# Patient Record
Sex: FEMALE | Race: WHITE | NOT HISPANIC OR LATINO | Employment: OTHER | URBAN - METROPOLITAN AREA
[De-identification: names, ages, dates, MRNs, and addresses within clinical notes are randomized per-mention and may not be internally consistent; named-entity substitution may affect disease eponyms.]

---

## 2024-04-09 ENCOUNTER — HOSPITAL ENCOUNTER (OUTPATIENT)
Facility: MEDICAL CENTER | Age: 54
End: 2024-04-12
Attending: EMERGENCY MEDICINE | Admitting: HOSPITALIST
Payer: MEDICARE

## 2024-04-09 DIAGNOSIS — G89.18 POST-OP PAIN: Primary | ICD-10-CM

## 2024-04-09 DIAGNOSIS — I10 HYPERTENSION, UNSPECIFIED TYPE: ICD-10-CM

## 2024-04-09 DIAGNOSIS — G89.18 POST-OPERATIVE PAIN: ICD-10-CM

## 2024-04-09 DIAGNOSIS — R00.0 SINUS TACHYCARDIA: ICD-10-CM

## 2024-04-09 PROCEDURE — 99285 EMERGENCY DEPT VISIT HI MDM: CPT

## 2024-04-09 PROCEDURE — 700111 HCHG RX REV CODE 636 W/ 250 OVERRIDE (IP): Mod: JZ | Performed by: STUDENT IN AN ORGANIZED HEALTH CARE EDUCATION/TRAINING PROGRAM

## 2024-04-09 PROCEDURE — 96375 TX/PRO/DX INJ NEW DRUG ADDON: CPT | Mod: XU

## 2024-04-09 PROCEDURE — 700105 HCHG RX REV CODE 258: Performed by: EMERGENCY MEDICINE

## 2024-04-09 PROCEDURE — 96374 THER/PROPH/DIAG INJ IV PUSH: CPT

## 2024-04-09 PROCEDURE — 700111 HCHG RX REV CODE 636 W/ 250 OVERRIDE (IP): Performed by: EMERGENCY MEDICINE

## 2024-04-09 RX ORDER — SODIUM CHLORIDE 9 MG/ML
1000 INJECTION, SOLUTION INTRAVENOUS ONCE
Status: COMPLETED | OUTPATIENT
Start: 2024-04-09 | End: 2024-04-10

## 2024-04-09 RX ORDER — LORAZEPAM 2 MG/ML
1 INJECTION INTRAMUSCULAR ONCE
Status: COMPLETED | OUTPATIENT
Start: 2024-04-09 | End: 2024-04-09

## 2024-04-09 RX ORDER — SODIUM CHLORIDE 9 MG/ML
INJECTION, SOLUTION INTRAVENOUS CONTINUOUS
Status: DISCONTINUED | OUTPATIENT
Start: 2024-04-09 | End: 2024-04-10

## 2024-04-09 RX ADMIN — SODIUM CHLORIDE 1000 ML: 9 INJECTION, SOLUTION INTRAVENOUS at 22:31

## 2024-04-09 RX ADMIN — FENTANYL CITRATE 100 MCG: 50 INJECTION, SOLUTION INTRAMUSCULAR; INTRAVENOUS at 21:49

## 2024-04-09 RX ADMIN — LORAZEPAM 1 MG: 2 INJECTION INTRAMUSCULAR; INTRAVENOUS at 22:30

## 2024-04-09 ASSESSMENT — PAIN DESCRIPTION - PAIN TYPE: TYPE: ACUTE PAIN

## 2024-04-10 ENCOUNTER — APPOINTMENT (OUTPATIENT)
Dept: CARDIOLOGY | Facility: MEDICAL CENTER | Age: 54
End: 2024-04-10
Attending: HOSPITALIST
Payer: MEDICARE

## 2024-04-10 PROBLEM — R00.0 TACHYCARDIA: Status: ACTIVE | Noted: 2024-04-10

## 2024-04-10 PROBLEM — G89.18 POST-OPERATIVE PAIN: Status: ACTIVE | Noted: 2024-04-10

## 2024-04-10 LAB
ALBUMIN SERPL BCP-MCNC: 3.6 G/DL (ref 3.2–4.9)
ALBUMIN/GLOB SERPL: 1.7 G/DL
ALP SERPL-CCNC: 79 U/L (ref 30–99)
ALT SERPL-CCNC: 11 U/L (ref 2–50)
ANION GAP SERPL CALC-SCNC: 15 MMOL/L (ref 7–16)
AST SERPL-CCNC: 16 U/L (ref 12–45)
BASOPHILS # BLD AUTO: 0.1 % (ref 0–1.8)
BASOPHILS # BLD: 0.02 K/UL (ref 0–0.12)
BILIRUB SERPL-MCNC: 0.4 MG/DL (ref 0.1–1.5)
BLOOD CULTURE HOLD CXBCH: NORMAL
BUN SERPL-MCNC: 19 MG/DL (ref 8–22)
CALCIUM ALBUM COR SERPL-MCNC: 8.7 MG/DL (ref 8.5–10.5)
CALCIUM SERPL-MCNC: 8.4 MG/DL (ref 8.4–10.2)
CHLORIDE SERPL-SCNC: 102 MMOL/L (ref 96–112)
CO2 SERPL-SCNC: 16 MMOL/L (ref 20–33)
CREAT SERPL-MCNC: 0.9 MG/DL (ref 0.5–1.4)
EKG IMPRESSION: NORMAL
EOSINOPHIL # BLD AUTO: 0 K/UL (ref 0–0.51)
EOSINOPHIL NFR BLD: 0 % (ref 0–6.9)
ERYTHROCYTE [DISTWIDTH] IN BLOOD BY AUTOMATED COUNT: 45.5 FL (ref 35.9–50)
GFR SERPLBLD CREATININE-BSD FMLA CKD-EPI: 75 ML/MIN/1.73 M 2
GLOBULIN SER CALC-MCNC: 2.1 G/DL (ref 1.9–3.5)
GLUCOSE SERPL-MCNC: 133 MG/DL (ref 65–99)
HCT VFR BLD AUTO: 30.6 % (ref 37–47)
HGB BLD-MCNC: 9.9 G/DL (ref 12–16)
IMM GRANULOCYTES # BLD AUTO: 0.04 K/UL (ref 0–0.11)
IMM GRANULOCYTES NFR BLD AUTO: 0.3 % (ref 0–0.9)
LV EJECT FRACT  99904: 70
LV EJECT FRACT MOD 2C 99903: 53.26
LV EJECT FRACT MOD 4C 99902: 65.72
LV EJECT FRACT MOD BP 99901: 60.68
LYMPHOCYTES # BLD AUTO: 2.55 K/UL (ref 1–4.8)
LYMPHOCYTES NFR BLD: 18.1 % (ref 22–41)
MCH RBC QN AUTO: 32.8 PG (ref 27–33)
MCHC RBC AUTO-ENTMCNC: 32.4 G/DL (ref 32.2–35.5)
MCV RBC AUTO: 101.3 FL (ref 81.4–97.8)
MONOCYTES # BLD AUTO: 1.11 K/UL (ref 0–0.85)
MONOCYTES NFR BLD AUTO: 7.9 % (ref 0–13.4)
NEUTROPHILS # BLD AUTO: 10.37 K/UL (ref 1.82–7.42)
NEUTROPHILS NFR BLD: 73.6 % (ref 44–72)
NRBC # BLD AUTO: 0 K/UL
NRBC BLD-RTO: 0 /100 WBC (ref 0–0.2)
PLATELET # BLD AUTO: 259 K/UL (ref 164–446)
PMV BLD AUTO: 9.9 FL (ref 9–12.9)
POTASSIUM SERPL-SCNC: 4.9 MMOL/L (ref 3.6–5.5)
PROT SERPL-MCNC: 5.7 G/DL (ref 6–8.2)
RBC # BLD AUTO: 3.02 M/UL (ref 4.2–5.4)
SODIUM SERPL-SCNC: 133 MMOL/L (ref 135–145)
WBC # BLD AUTO: 14.1 K/UL (ref 4.8–10.8)

## 2024-04-10 PROCEDURE — 302874 HCHG BANDAGE ACE 2 OR 3""

## 2024-04-10 PROCEDURE — 96376 TX/PRO/DX INJ SAME DRUG ADON: CPT | Mod: XU

## 2024-04-10 PROCEDURE — 700105 HCHG RX REV CODE 258: Performed by: EMERGENCY MEDICINE

## 2024-04-10 PROCEDURE — 36415 COLL VENOUS BLD VENIPUNCTURE: CPT

## 2024-04-10 PROCEDURE — 700102 HCHG RX REV CODE 250 W/ 637 OVERRIDE(OP): Performed by: HOSPITALIST

## 2024-04-10 PROCEDURE — 94760 N-INVAS EAR/PLS OXIMETRY 1: CPT

## 2024-04-10 PROCEDURE — G0378 HOSPITAL OBSERVATION PER HR: HCPCS

## 2024-04-10 PROCEDURE — 93005 ELECTROCARDIOGRAM TRACING: CPT | Performed by: HOSPITALIST

## 2024-04-10 PROCEDURE — 96375 TX/PRO/DX INJ NEW DRUG ADDON: CPT | Mod: XU

## 2024-04-10 PROCEDURE — 85025 COMPLETE CBC W/AUTO DIFF WBC: CPT

## 2024-04-10 PROCEDURE — A9270 NON-COVERED ITEM OR SERVICE: HCPCS | Performed by: HOSPITALIST

## 2024-04-10 PROCEDURE — 93306 TTE W/DOPPLER COMPLETE: CPT | Mod: 26 | Performed by: INTERNAL MEDICINE

## 2024-04-10 PROCEDURE — 700111 HCHG RX REV CODE 636 W/ 250 OVERRIDE (IP): Mod: JZ | Performed by: EMERGENCY MEDICINE

## 2024-04-10 PROCEDURE — 700102 HCHG RX REV CODE 250 W/ 637 OVERRIDE(OP): Performed by: EMERGENCY MEDICINE

## 2024-04-10 PROCEDURE — 99222 1ST HOSP IP/OBS MODERATE 55: CPT | Mod: 25 | Performed by: HOSPITALIST

## 2024-04-10 PROCEDURE — A9270 NON-COVERED ITEM OR SERVICE: HCPCS | Performed by: EMERGENCY MEDICINE

## 2024-04-10 PROCEDURE — 93306 TTE W/DOPPLER COMPLETE: CPT

## 2024-04-10 PROCEDURE — 80053 COMPREHEN METABOLIC PANEL: CPT

## 2024-04-10 PROCEDURE — 700111 HCHG RX REV CODE 636 W/ 250 OVERRIDE (IP): Mod: JZ | Performed by: STUDENT IN AN ORGANIZED HEALTH CARE EDUCATION/TRAINING PROGRAM

## 2024-04-10 RX ORDER — OXYCODONE HYDROCHLORIDE AND ACETAMINOPHEN 5; 325 MG/1; MG/1
1 TABLET ORAL ONCE
Status: COMPLETED | OUTPATIENT
Start: 2024-04-10 | End: 2024-04-10

## 2024-04-10 RX ORDER — OXYCODONE HYDROCHLORIDE 5 MG/1
5 TABLET ORAL
Status: DISCONTINUED | OUTPATIENT
Start: 2024-04-10 | End: 2024-04-12 | Stop reason: HOSPADM

## 2024-04-10 RX ORDER — CARISOPRODOL 350 MG/1
350 TABLET ORAL EVERY 8 HOURS PRN
COMMUNITY

## 2024-04-10 RX ORDER — SUMATRIPTAN 100 MG/1
100 TABLET, FILM COATED ORAL
COMMUNITY

## 2024-04-10 RX ORDER — ONDANSETRON 2 MG/ML
4 INJECTION INTRAMUSCULAR; INTRAVENOUS ONCE
Status: COMPLETED | OUTPATIENT
Start: 2024-04-10 | End: 2024-04-10

## 2024-04-10 RX ORDER — HYDROCODONE BITARTRATE AND ACETAMINOPHEN 5; 325 MG/1; MG/1
1 TABLET ORAL EVERY 6 HOURS PRN
COMMUNITY

## 2024-04-10 RX ORDER — ACETAMINOPHEN 325 MG/1
650 TABLET ORAL EVERY 6 HOURS PRN
Status: DISCONTINUED | OUTPATIENT
Start: 2024-04-10 | End: 2024-04-12 | Stop reason: HOSPADM

## 2024-04-10 RX ORDER — SUMATRIPTAN 25 MG/1
50 TABLET, FILM COATED ORAL ONCE
Status: COMPLETED | OUTPATIENT
Start: 2024-04-10 | End: 2024-04-10

## 2024-04-10 RX ORDER — LORAZEPAM 2 MG/ML
0.5 INJECTION INTRAMUSCULAR ONCE
Status: DISCONTINUED | OUTPATIENT
Start: 2024-04-10 | End: 2024-04-10

## 2024-04-10 RX ORDER — OXYCODONE HYDROCHLORIDE 5 MG/1
2.5 TABLET ORAL
Status: DISCONTINUED | OUTPATIENT
Start: 2024-04-10 | End: 2024-04-12 | Stop reason: HOSPADM

## 2024-04-10 RX ORDER — SODIUM CHLORIDE 9 MG/ML
INJECTION, SOLUTION INTRAVENOUS CONTINUOUS
Status: DISCONTINUED | OUTPATIENT
Start: 2024-04-10 | End: 2024-04-10

## 2024-04-10 RX ORDER — MORPHINE SULFATE 4 MG/ML
4 INJECTION INTRAVENOUS ONCE
Status: COMPLETED | OUTPATIENT
Start: 2024-04-10 | End: 2024-04-10

## 2024-04-10 RX ORDER — LORAZEPAM 1 MG/1
1 TABLET ORAL EVERY 6 HOURS PRN
Status: DISCONTINUED | OUTPATIENT
Start: 2024-04-10 | End: 2024-04-12 | Stop reason: HOSPADM

## 2024-04-10 RX ORDER — METHOCARBAMOL 750 MG/1
750 TABLET, FILM COATED ORAL 3 TIMES DAILY
COMMUNITY

## 2024-04-10 RX ORDER — MORPHINE SULFATE 4 MG/ML
2 INJECTION INTRAVENOUS
Status: DISCONTINUED | OUTPATIENT
Start: 2024-04-10 | End: 2024-04-12 | Stop reason: HOSPADM

## 2024-04-10 RX ORDER — SODIUM CHLORIDE 9 MG/ML
1000 INJECTION, SOLUTION INTRAVENOUS ONCE
Status: COMPLETED | OUTPATIENT
Start: 2024-04-10 | End: 2024-04-10

## 2024-04-10 RX ADMIN — OXYCODONE HYDROCHLORIDE 5 MG: 5 TABLET ORAL at 17:59

## 2024-04-10 RX ADMIN — OXYCODONE AND ACETAMINOPHEN 1 TABLET: 325; 5 TABLET ORAL at 08:54

## 2024-04-10 RX ADMIN — SODIUM CHLORIDE: 9 INJECTION, SOLUTION INTRAVENOUS at 02:49

## 2024-04-10 RX ADMIN — OXYCODONE HYDROCHLORIDE 5 MG: 5 TABLET ORAL at 23:00

## 2024-04-10 RX ADMIN — SODIUM CHLORIDE 1000 ML: 9 INJECTION, SOLUTION INTRAVENOUS at 09:45

## 2024-04-10 RX ADMIN — MORPHINE SULFATE 4 MG: 4 INJECTION, SOLUTION INTRAMUSCULAR; INTRAVENOUS at 10:28

## 2024-04-10 RX ADMIN — MORPHINE SULFATE 4 MG: 4 INJECTION, SOLUTION INTRAMUSCULAR; INTRAVENOUS at 05:56

## 2024-04-10 RX ADMIN — ONDANSETRON 4 MG: 2 INJECTION INTRAMUSCULAR; INTRAVENOUS at 05:56

## 2024-04-10 RX ADMIN — ACETAMINOPHEN 650 MG: 325 TABLET ORAL at 19:43

## 2024-04-10 RX ADMIN — LORAZEPAM 1 MG: 1 TABLET ORAL at 12:38

## 2024-04-10 RX ADMIN — LORAZEPAM 1 MG: 1 TABLET ORAL at 19:44

## 2024-04-10 RX ADMIN — SUMATRIPTAN 50 MG: 25 TABLET ORAL at 06:45

## 2024-04-10 RX ADMIN — ONDANSETRON 4 MG: 2 INJECTION INTRAMUSCULAR; INTRAVENOUS at 02:50

## 2024-04-10 RX ADMIN — FENTANYL CITRATE 100 MCG: 50 INJECTION, SOLUTION INTRAMUSCULAR; INTRAVENOUS at 02:50

## 2024-04-10 ASSESSMENT — LIFESTYLE VARIABLES
HAVE PEOPLE ANNOYED YOU BY CRITICIZING YOUR DRINKING: NO
HOW MANY TIMES IN THE PAST YEAR HAVE YOU HAD 5 OR MORE DRINKS IN A DAY: 0
TOTAL SCORE: 0
ON A TYPICAL DAY WHEN YOU DRINK ALCOHOL HOW MANY DRINKS DO YOU HAVE: 0
CONSUMPTION TOTAL: NEGATIVE
ALCOHOL_USE: NO
TOTAL SCORE: 0
TOTAL SCORE: 0
HAVE YOU EVER FELT YOU SHOULD CUT DOWN ON YOUR DRINKING: NO
AVERAGE NUMBER OF DAYS PER WEEK YOU HAVE A DRINK CONTAINING ALCOHOL: 0
EVER HAD A DRINK FIRST THING IN THE MORNING TO STEADY YOUR NERVES TO GET RID OF A HANGOVER: NO
DOES PATIENT WANT TO STOP DRINKING: NO
EVER FELT BAD OR GUILTY ABOUT YOUR DRINKING: NO

## 2024-04-10 ASSESSMENT — COGNITIVE AND FUNCTIONAL STATUS - GENERAL
TURNING FROM BACK TO SIDE WHILE IN FLAT BAD: A LITTLE
DRESSING REGULAR UPPER BODY CLOTHING: A LOT
PERSONAL GROOMING: A LOT
SUGGESTED CMS G CODE MODIFIER MOBILITY: CK
MOBILITY SCORE: 17
DAILY ACTIVITIY SCORE: 13
MOVING FROM LYING ON BACK TO SITTING ON SIDE OF FLAT BED: A LITTLE
WALKING IN HOSPITAL ROOM: A LITTLE
EATING MEALS: A LITTLE
CLIMB 3 TO 5 STEPS WITH RAILING: A LITTLE
SUGGESTED CMS G CODE MODIFIER DAILY ACTIVITY: CL
MOVING TO AND FROM BED TO CHAIR: A LOT
TOILETING: A LOT
STANDING UP FROM CHAIR USING ARMS: A LITTLE
DRESSING REGULAR LOWER BODY CLOTHING: A LOT
HELP NEEDED FOR BATHING: A LOT

## 2024-04-10 ASSESSMENT — PATIENT HEALTH QUESTIONNAIRE - PHQ9
SUM OF ALL RESPONSES TO PHQ9 QUESTIONS 1 AND 2: 0
2. FEELING DOWN, DEPRESSED, IRRITABLE, OR HOPELESS: NOT AT ALL
1. LITTLE INTEREST OR PLEASURE IN DOING THINGS: NOT AT ALL

## 2024-04-10 ASSESSMENT — ENCOUNTER SYMPTOMS
HEMOPTYSIS: 0
CHILLS: 0
VOMITING: 0
PALPITATIONS: 0
COUGH: 0
NAUSEA: 1
DIZZINESS: 0
ORTHOPNEA: 0
SPUTUM PRODUCTION: 0

## 2024-04-10 ASSESSMENT — PAIN DESCRIPTION - PAIN TYPE
TYPE: SURGICAL PAIN
TYPE: SURGICAL PAIN

## 2024-04-10 ASSESSMENT — FIBROSIS 4 INDEX: FIB4 SCORE: 1.02

## 2024-04-10 NOTE — ED NOTES
Pt arm soft, color to hand improved  Ace wrap on arm , no pressure at all on arm  Report called to floor , no further questions  Transport pending

## 2024-04-10 NOTE — DISCHARGE PLANNING
ER LUCHO met with pt at bedside. AOX4. She did elective large surgery at [facility] surgery [place] with Dr Osiris Travis. She planned to stay by herself post op at [facility] Room 1655. She plans to Fly out to [facility] at 11:15 on 1.5 hr flight. She will need to check out of hotel and check in for flight by 8 am. All family in [place]. She has no caregivers planned. She is given Attendant care list as a option for her needs. She is upset her dressing were tight. These were placed at the surgical center she was treated at prior to arrival . She has planned no extra monies for this or travel to and from hospital to hot or needs. PCP Dr Turner in [place]. Does not want address for home added to facesheet [id number] [facility]. She came to [place] for OR as MD Travis and she are personal friends. ER LUCHO provided cab number for belongings if needed.She is convinced MD office will assist her.   Care Transition Team Assessment    Information Source  Orientation Level: Oriented X4  Information Given By: Patient  Informant's Name: Asha  Who is responsible for making decisions for patient? : Patient         Elopement Risk  Legal Hold: No    Interdisciplinary Discharge Planning  Primary Care Physician: Rj Murray Md  Lives with - Patient's Self Care Capacity: Alone and Unable to Care For Self  Support Systems: None  Housing / Facility: Motel, Other (Comments) ([facility]/ [place] [address] [facility])  Do You Take your Prescribed Medications Regularly: Yes  Mobility Issues: Yes  Durable Medical Equipment: Not Applicable    Discharge Preparedness  What is your plan after discharge?: Home with help  What are your discharge supports?: Other (comment) (NONE)  Prior Functional Level: Ambulatory, Independent with Activities of Daily Living, Independent with Medication Management    Functional Assesment  Prior Functional Level: Ambulatory, Independent with Activities of Daily Living, Independent with Medication  Management    Finances  Prescription Coverage: Yes                   Domestic Abuse  Have you ever been the victim of abuse or violence?: No              Anticipated Discharge Information  Discharge Disposition: Discharged to home/self care (01)

## 2024-04-10 NOTE — ED PROVIDER NOTES
ED Provider Note    Scribed for Andi Rayo by Andi Rayo. 4/9/2024  9:51 PM    Primary care provider: George Tabares M.D.  Means of arrival: EMS  History obtained from: Patient  History limited by: None    CHIEF COMPLAINT  Chief Complaint   Patient presents with    Post-Op Complications     Pt presents via EMS d/t post operative pain after having Thorachoplasty, bilateral breast mastopexy, buttock lift, left thigh lift, fat transfer to buttocks, lipostuction and fat harvesting done today.      HPI/ROS  LIMITATION TO HISTORY   Select: : None  OUTSIDE HISTORIAN(S):  EMS provided helpful collateral information    HPI  Asha Davalos is a 53 y.o. female who presents to the Emergency Department with postoperative pain.  Patient apparently underwent multiple cosmetic plastic surgeries today at outpatient surgical suite by Dr. Angela.  Reportedly from EMS patient was having too severe of postoperative pain, and was not safe for discharge show was transported emergently to our department for pain control and pain management.  She received large doses of fentanyl prior to arrival.  States that her postoperative pain is significant and she is unable to care for herself.  She was supposed to have a hotel rented in town to stay at but did not feel comfortable going there after the surgery due to her level of pain.  She has had multiple plastic surgery procedures in the past as well.    REVIEW OF SYSTEMS  As above, all other systems reviewed and are negative.   See HPI for further details.     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  SURGICAL HISTORY   has a past surgical history that includes thomas by laparoscopy (10/7/2010); orif, wrist (2/2013); mammoplasty reduction (2006); eye surgery (1999); gastric bypass  "laparoscopic (2009); abdominal hysterectomy total (2004); primary c section (2002); hardware removal ortho (7/23/2014); breast reduction (   2006); and panniculectomy (N/A, 10/28/2015).  SOCIAL HISTORY  Social History     Tobacco Use    Smoking status: Never    Smokeless tobacco: Never   Substance Use Topics    Alcohol use: No     Alcohol/week: 0.0 oz     Comment: very rare    Drug use: Yes     Types: Marijuana      Social History     Substance and Sexual Activity   Drug Use Yes    Types: Marijuana     FAMILY HISTORY  Family History   Problem Relation Age of Onset    Arthritis Mother     Hypertension Mother     Thyroid Mother     Diabetes Paternal Uncle     Heart Attack Paternal Uncle     Heart Disease Paternal Uncle 70        Sudden death after bypass surgery    Alcohol/Drug Neg Hx     Allergies Neg Hx     Cancer Neg Hx     Hyperlipidemia Neg Hx     Psychiatric Illness Neg Hx     Stroke Neg Hx      CURRENT MEDICATIONS  Home Medications       Reviewed by Sotero Acevedo R.N. (Registered Nurse) on 04/09/24 at 2140  Med List Status: Not Addressed     Medication Last Dose Status   ascorbic acid (ASCORBIC ACID) 500 MG TABS  Active   Cholecalciferol (VITAMIN D) 2000 UNIT TABS  Active   clonazepam (KLONOPIN) 1 MG Tab  Active   Cyanocobalamin (B-12 SL)  Active   estradiol (VIVELLE DOT) 0.05 MG/24HR PATCH BIWEEKLY  Active   gabapentin (NEURONTIN) 100 MG Cap  Active   hydrocodone-acetaminophen (NORCO) 5-325 MG Tab per tablet  Active   Multiple Vitamin (MULTIVITAMINS PO)  Active   ondansetron (ZOFRAN) 4 MG Tab tablet  Active   sumatriptan (IMITREX) 25 MG Tab  Active   vitamin D, Ergocalciferol, (DRISDOL) 85473 UNITS Cap capsule  Active                  ALLERGIES  Allergies   Allergen Reactions    Ceftin Itching     \"my insides are itchy.\"     PHYSICAL EXAM    VITAL SIGNS:   Vitals:    04/09/24 2221 04/09/24 2223 04/09/24 2230 04/09/24 2231   BP:       Pulse: (!) 124 (!) 123 (!) 124 (!) 139   Resp:       Temp:     "   TempSrc:       SpO2: 96% 95% 95% 96%   Weight:       Height:         Vitals: My interpretation: normotensive, tachycardic, afebrile, not hypoxic    Reinterpretation of vitals: Improved     Cardiac Monitor Interpretation: The cardiac monitor revealed normal Sinus Rhythm with tachycardia as interpreted by me. The cardiac monitor was ordered secondary to the patient's history of tachycardia and to monitor for dysrhythmia and/or tachycardia.    PE:   Gen: sitting comfortably, speaking clearly, appears in no acute distress   ENT: Mucous membranes moist, posterior pharynx clear, uvula midline, nares patent bilaterally   Neck: Supple, FROM  Pulmonary: Lungs are clear to auscultation bilaterally. No tachypnea  CV:  RRR, no murmur appreciated, pulses 2+ in both upper and lower extremities  Abdomen: soft, NT/ND; no rebound/guarding  : no CVA or suprapubic tenderness   Neuro: A&Ox4 (person, place, time, situation), speech fluent, gait steady, no focal deficits appreciated  Skin: Multiple post operative surgical sites are clean, dry and intact. Decreased movement of the arms secondary to pain     COURSE & MEDICAL DECISION MAKING  Nursing notes, VS, PMSFHx, labs, imaging, EKG reviewed in chart.    ED Observation Status? Yes; I am placing the patient in to an observation status due to a diagnostic uncertainty as well as therapeutic intensity. Patient placed in observation status at 10:27 PM, 4/9/2024.     Observation plan is as follows: pain control, monitoring vitals, post op visit in the AM and DC tomorrow when improved     Upon Reevaluation, the patient's condition has: signed out to oncoming physician for continued monitoring.     Ddx: Postoperative pain, postoperative infection, dehydration    MDM: 9:51 PM Asha Davalos is a 53 y.o. female who presented with postoperative pain and inability to care for self after having multiple large cosmetic plastic surgeries done today by Dr. Angela.  Apparently after awaking  from anesthesia patient was unable to care for self and is not meeting discharge parameters and ultimately had uncontrolled pain and received multiple doses of pain medication causing hypoxia and patient was transported to the ED.  I was not aware of the transfer prior to the patient's arrival by EMS.  EMS provided helpful collateral patient regarding patient's presentation.  Upon arrival here patient is tachycardic and hypertensive likely secondary to pain.  She was started on IV fluids and given a low-dose of Ativan for severe anxiety and aggressive behavior.  Her physical exam shows multiple bandaged cosmetic plastic surgery sites which appear clean dry and intact.  Patient feels she is unable to feed himself or take care of himself in any regard at this time and was so stay at a hotel in town by herself tonight but did not feel safe so she was transported here.  I was able to contact the transferring physician to find out exactly why she was transported here what their plan was.  Main goal is pain control and hopefully discharge in the morning.  Plastic surgeon will come by in the morning, Dr. Angela, to reevaluate the patient to make sure she is appropriate discharge.  Patient placed in ED observation for continued pain management and IV fluid hydration.  Signed out to oncoming physician who continue monitor and discharge appropriately in the morning if stable.    ADDITIONAL PROBLEM LIST AND DISPOSITION    I have discussed management of the patient with the following physicians and SERA's:  None    Discussion of management with other QHP or appropriate source(s): None     Barriers to care at this time, including but not limited to: Patient does not have established PCP.     Decision tools and prescription drugs considered including, but not limited to:  None .    FINAL IMPRESSION  1. Post-op pain Acute   2. Sinus tachycardia Acute   3. Hypertension, unspecified type Acute      The note accurately reflects work  and decisions made by me.  Andi Rayo  4/9/2024  9:51 PM

## 2024-04-10 NOTE — ASSESSMENT & PLAN NOTE
Patient has a persistent tachycardia at rest despite fluid resuscitation in the emergency room    4/11:  Patient continues to have tachycardia today  Echocardiogram unremarkable  She does have recent history of travel  I have ordered a CTA to rule out pulmonary embolism

## 2024-04-10 NOTE — ED NOTES
"Dr Angela office called.  Stating he has been calling them.  Stating she is suicidal.  When asked directly to pt if she is suicidal her answer is \"no\".  \"But if the pain continues , I'm might be\"      "

## 2024-04-10 NOTE — DISCHARGE PLANNING
Anticipated Discharge Disposition: Hotel/Home    Action: Voalte from bedside RN query on ER CM status of ER CM contact with [facility]. CM was not in contact with her hotel. Pt will need to be in direct contact with hotel herself as ER CM cannot address pt accomodations for her. ER CM did as noted  prior, provide local cab company contact info if needed for her belongings if she wanted to see if they could come to a arranagement with  them and hotel? [employer] . She at the time of the discussion dismissed CM and was adamant she had it arranged, and if not would leave room stand as  is , as she did not need to check out till  tomorrow anyway and go by to get belongings in am prior to flight and precheck for 8am. She did not care to address further. She was given attendant care list incase she is not able to mobilize well  enough for that and has to stay in [facility] longer. She did not bring anyone with her or caregiver. She will not qualify for alternate levels of care as procedure was elective outpt.    Barriers to Discharge: Pain and mobility.    Plan: No further ER CM needs at this time.

## 2024-04-10 NOTE — ASSESSMENT & PLAN NOTE
History of Lynsey-Danlos disease  Patient states that she had preoperative evaluation by cardiology

## 2024-04-10 NOTE — ED NOTES
Pt seen and evaluated by Dr Angela.  Pt medicated as directed by ER md, poc update given to pt.    Breakfast tray ordered.

## 2024-04-10 NOTE — DISCHARGE INSTRUCTIONS
Please take medications as provided to you by your plastic surgeon.  Please follow-up with your outpatient team for further evaluation and treatment.  If any worsening symptoms or concerns please come back to the ED.  Thank you for coming in today.    Remove drains Remove the ioban (yellow tape) and place new pads on the back incision (May hold in place with tegaderm) ABDs on the lower breast (under bra) Sponge bath only.  Keep left arm wound covered with pads (thick maxi pads ok) and wrap with ace. Use butt pillow while sitting.

## 2024-04-10 NOTE — ED NOTES
Pt removed her bandages herself for upper arms.  Pt slightly anxious, medicated with po ativan  Lunch tray given

## 2024-04-10 NOTE — H&P
[facility] Medicine History & Physical Note    Date of Service  4/10/2024    Primary Care Physician  George Tabares M.D.    Consultants  None      Code Status  Full Code    Chief Complaint  Chief Complaint   Patient presents with    Post-Op Complications     Pt presents via EMS d/t post operative pain after having Thorachoplasty, bilateral breast mastopexy, buttock lift, left thigh lift, fat transfer to buttocks, lipostuction and fat harvesting done today.        History of Presenting Illness  Asha Davalos is a 53 y.o. female who presented 4/9/2024 with postoperative pain.    I have reviewed some of the recent provider documentation available to me in the patient's medical chart.  Records are briefly summarized: Carlos Davalos has past medical history that includes gastric bypass, Lynsey-Danlos disease, and prior cosmetic surgery.  Patient resides in [facility], her last visit documented within the [facility] is from 2016 when she was seen by primary care.  At that time her chronic pain was addressed.    The patient underwent cosmetic surgery yesterday.  Procedure performed: Thoracoplasty, bilateral breast mastopexy, buttock lift, left thigh lift, fat transfer devices, liposuction, and fat harvesting.  The patient was in significant pain postoperatively and was transferred directly to the emergency room for evaluation.  She was noted to be tachycardic and required IV pain medications.  She was observed overnight in the emergency room.  This morning I was asked by Dr. Fernandes to admit the patient.  Patient continues to have tachycardia with a heart rate in the 140s and she is having difficulty mobilizing due to pain.    During the interview, the patient complains of discomfort at the surgical sites as well as from compression garments that she was fitted with.  She does feel her heart beating fast, she does not feel dizzy or lightheaded.  Patient denies chest pain, she is not short of breath, she  is not requiring any oxygen.  States that she has frequent echocardiograms due to her history of Lynsey-Danlos syndrome, her last one was performed a few months ago and was unremarkable per the patient.    I discussed the plan of care with emergency room physician Dr. Fernandes, we discussed the patient's response to fluid resuscitation and ongoing tachycardia    Review of Systems  Review of Systems   Constitutional:  Negative for chills and malaise/fatigue.   Respiratory:  Negative for cough, hemoptysis and sputum production.    Cardiovascular:  Negative for chest pain, palpitations and orthopnea.   Gastrointestinal:  Positive for nausea. Negative for vomiting.   Skin:  Negative for itching and rash.   Neurological:  Negative for dizziness.   All other systems reviewed and are negative.      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    Surgical History   has a past surgical history that includes thomas by laparoscopy (10/7/2010); orif, wrist (2/2013); mammoplasty reduction (2006); eye surgery (1999); gastric bypass laparoscopic (2009); abdominal hysterectomy total (2004); primary c section (2002); hardware removal ortho (7/23/2014); pr breast reduction (   2006); and panniculectomy (N/A, 10/28/2015).     Family History  family history includes Arthritis in her mother; Diabetes in her paternal uncle; Heart Attack in her paternal uncle; Heart Disease (age of onset: 70) in her paternal uncle; Hypertension in her mother; Thyroid in her mother.   Family history reviewed with patient. There is no family history that is pertinent to the chief complaint.     Social History   reports that she has never smoked. She has never used smokeless tobacco. She reports current drug use. Drug: Marijuana. She reports that she does not drink  "alcohol.    Allergies  Allergies   Allergen Reactions    Ceftin Itching     \"my insides are itchy.\"       Medications  Prior to Admission Medications   Prescriptions Last Dose Informant Patient Reported? Taking?   HYDROcodone-acetaminophen (NORCO) 5-325 MG Tab per tablet Unknown at Unknown Patient's Home Pharmacy Yes Yes   Sig: Take 1 Tablet by mouth every 6 hours as needed. Per [facility], pt last filled on 2/8/2024 #60 tablets  Indications: Pain   carisoprodol (SOMA) 350 MG Tab Unknown at Unknown Patient's Home Pharmacy Yes Yes   Sig: Take 350 mg by mouth every 8 hours as needed for Muscle Spasms. Per [facility], pt last filled on 2/16/2024 #15 tablets   methocarbamol (ROBAXIN) 750 MG Tab Unknown at Unknown Patient's Home Pharmacy Yes Yes   Sig: Take 750 mg by mouth 3 times a day. Per [facility], pt last filled on 3/26/2024   sumatriptan (IMITREX) 100 MG tablet Unknown at Unknown Patient's Home Pharmacy Yes Yes   Sig: Take 100 mg by mouth one time as needed for Migraine.      Facility-Administered Medications: None       Physical Exam  Temp:  [36.8 °C (98.2 °F)] 36.8 °C (98.2 °F)  Pulse:  [] 131  Resp:  [20] 20  BP: (104-145)/(62-96) 118/63  SpO2:  [93 %-100 %] 97 %  Blood Pressure: 118/63   Temperature: 36.8 °C (98.2 °F)   Pulse: (!) 131   Respiration: 20   Pulse Oximetry: 97 %       Physical Exam  Constitutional:       Appearance: Normal appearance.   HENT:      Head: Normocephalic and atraumatic.      Right Ear: External ear normal.      Left Ear: External ear normal.      Mouth/Throat:      Mouth: Mucous membranes are moist.      Pharynx: Oropharynx is clear.   Eyes:      Extraocular Movements: Extraocular movements intact.      Conjunctiva/sclera: Conjunctivae normal.      Pupils: Pupils are equal, round, and reactive to light.   Cardiovascular:      Rate and Rhythm: Regular rhythm. Tachycardia present.      Pulses: Normal pulses.   Pulmonary:      Effort: Pulmonary " "effort is normal. No respiratory distress.      Breath sounds: Normal breath sounds.   Abdominal:      General: Abdomen is flat. Bowel sounds are normal. There is no distension.      Palpations: Abdomen is soft.   Musculoskeletal:         General: Normal range of motion.      Cervical back: Normal range of motion and neck supple.      Comments: Swelling at bilateral hands   Skin:     General: Skin is warm and dry.      Capillary Refill: Capillary refill takes less than 2 seconds.      Coloration: Skin is not jaundiced.   Neurological:      General: No focal deficit present.      Mental Status: She is alert and oriented to person, place, and time.      Cranial Nerves: No cranial nerve deficit.      Gait: Gait normal.   Psychiatric:         Mood and Affect: Mood normal.         Behavior: Behavior normal.         Laboratory:  Recent Labs     04/10/24  1052   WBC 14.1*   RBC 3.02*   HEMOGLOBIN 9.9*   HEMATOCRIT 30.6*   .3*   MCH 32.8   MCHC 32.4   RDW 45.5   PLATELETCT 259   MPV 9.9     Recent Labs     04/10/24  1052   SODIUM 133*   POTASSIUM 4.9   CHLORIDE 102   CO2 16*   GLUCOSE 133*   BUN 19   CREATININE 0.90   CALCIUM 8.4     Recent Labs     04/10/24  1052   ALTSGPT 11   ASTSGOT 16   ALKPHOSPHAT 79   TBILIRUBIN 0.4   GLUCOSE 133*         No results for input(s): \"NTPROBNP\" in the last 72 hours.      No results for input(s): \"TROPONINT\" in the last 72 hours.    Imaging:  EC-ECHOCARDIOGRAM COMPLETE W/O CONT    (Results Pending)           Assessment/Plan:  Justification for Admission Status  I anticipate this patient is appropriate for observation status at this time because of working is appropriate to treat her with fluid hydration, pain control, and workup for possible causes of her tachycardia    Patient will need a Telemetry bed on MEDICAL service .  The need is secondary to tachycardia.    * Tachycardia- (present on admission)  Assessment & Plan  Patient has a persistent tachycardia at rest despite fluid " resuscitation in the emergency room  I ordered a twelve-lead EKG  Fluid resuscitation  Monitor on telemetry  Patient has history of Lynsey-Danlos disease as well as other complex medical history including gastric bypass, I ordered an echocardiogram to rule out effusion or structural heart disease as cause of her tachycardia    Post-operative pain- (present on admission)  Assessment & Plan  Pain management order set initiated    History of gastric bypass- (present on admission)  Assessment & Plan  Patient request modified diet without sugar    Lynsey-Danlos disease- (present on admission)  Assessment & Plan  History of Lynsey-Danlos disease  Patient states that she had preoperative evaluation by cardiology        VTE prophylaxis: SCDs/TEDs

## 2024-04-10 NOTE — ED NOTES
Medication history reviewed with pts pharmacy ([facility]). Med rec is complete.  Allergies reviewed, per pt    Pt reports that she is not sure the name.  Pt reports that she receives her medications from [facility].  Called [facility] @ [phone] to verify all mediciaotns.     Went back to ask pt last time she took these medications, per pt reports she does not know she can't tell me at this moment.  Pt reports that she takes a lot of vitamins but does not want to discus them right now.     Patient has not had any outpatient antibiotics in the last 30 days.    Pt is not on any anticoagulants

## 2024-04-10 NOTE — ED TRIAGE NOTES
"Chief Complaint   Patient presents with    Post-Op Complications     Pt presents via EMS d/t post operative pain after having Thorachoplasty, bilateral breast mastopexy, buttock lift, left thigh lift, fat transfer to buttocks, lipostuction and fat harvesting done today.      BP (!) 145/96   Pulse (!) 119   Temp 36.8 °C (98.2 °F) (Temporal)   Resp 20   Ht 1.651 m (5' 5\")   Wt 49.9 kg (110 lb)   SpO2 98%   BMI 18.30 kg/m²     "

## 2024-04-10 NOTE — DISCHARGE SUMMARY
ED Observation Discharge Summary    Patient:Asha Davalos  Patient : 1970  Patient MRN: [id number]  Patient PCP: George Tabares M.D.    Admit Date: 2024  Discharge Date and Time: 04/10/24 10:31 AM  Discharge Diagnosis: Postoperative pain, sinus tachycardia  Discharge Attending: Sandy Fernandes M.D.  Discharge Service: ED Observation    ED Course  Asha is a 53 y.o. female who was evaluated at [facility] for evaluation of postoperative pain.  Patient had complete body cosmetic surgery yesterday and at the surgical center was unable to care for herself and be safely discharged and therefore sent here for ongoing postoperative pain management.  Plan with overnight physician was discussed that she would be pain controlled and plan for discharge in the morning after pain control.      Unfortunately this morning patient is still having significant pain, requiring 2 people to help her even move in bed.  She is still not safe for discharge.  She is persistently tachycardic.  Dr. Angela did see the patient this morning and stated at this time patient just needs postoperative pain control, no need for other acute intervention.  Patient is frustrated as she has to check out of her hotel in the morning and requesting that we help her with this, case management spoke with her and offered her options.  Patient is insistent that she will likely have to be discharged early in the morning in order to check out of her hotel.  However at this time she will be hospitalized for ongoing management of postoperative pain.  I have ordered labs to assess for possible anemia causing tachycardia although I think is mostly likely combination of dehydration and pain.  Case discussed with Dr. Townsend who will hospitalize patient for ongoing management.  Patient is in guarded condition.        Discharge Exam:  /67   Pulse (!) 129   Temp 36.8 °C (98.2 °F) (Temporal)   Resp 20   Ht 1.651 m  "(5' 5\")   Wt 49.9 kg (110 lb)   SpO2 96%   BMI 18.30 kg/m² .    Constitutional: Awake and alert. Nontoxic  HENT:  Grossly normal  Eyes: Grossly normal  Neck: Normal range of motion  Cardiovascular: Tachycardic heart rate   Thorax & Lungs: No respiratory distress  Skin:  No pathologic rash.   Extremities: Covered in multiple dressings postoperatively  Psychiatric: Affect normal    Labs  Results for orders placed or performed during the hospital encounter of 04/22/16   Echocardiogram Comp w/o Cont   Result Value Ref Range    Eject.Frac. MOD BP 61.24     Eject.Frac. MOD 4C 57.11     Eject.Frac. MOD 2C 63.82     Left Ventrical Ejection Fraction 60        Radiology  No orders to display       Medications:   New Prescriptions    No medications on file       My final assessment includes postoperative pain, sinus tachycardia  Upon Reevaluation, the patient's condition has: not improved; and will be escalated to hospitalization.    Patient discharged from ED Observation status at 10:31 AM on 4/10/2024    Total time spent on this ED Observation discharge encounter is < 30 Minutes    Electronically signed by: Sandy Fernandes M.D., 4/10/2024 10:31 AM       "

## 2024-04-11 ENCOUNTER — APPOINTMENT (OUTPATIENT)
Dept: RADIOLOGY | Facility: MEDICAL CENTER | Age: 54
End: 2024-04-11
Attending: HOSPITALIST
Payer: MEDICARE

## 2024-04-11 PROBLEM — M79.89 LEFT ARM SWELLING: Status: ACTIVE | Noted: 2024-04-11

## 2024-04-11 LAB
ANION GAP SERPL CALC-SCNC: 9 MMOL/L (ref 7–16)
BASOPHILS # BLD AUTO: 0.3 % (ref 0–1.8)
BASOPHILS # BLD: 0.04 K/UL (ref 0–0.12)
BUN SERPL-MCNC: 13 MG/DL (ref 8–22)
CALCIUM SERPL-MCNC: 8.5 MG/DL (ref 8.4–10.2)
CHLORIDE SERPL-SCNC: 102 MMOL/L (ref 96–112)
CO2 SERPL-SCNC: 21 MMOL/L (ref 20–33)
CREAT SERPL-MCNC: 0.6 MG/DL (ref 0.5–1.4)
EOSINOPHIL # BLD AUTO: 0.04 K/UL (ref 0–0.51)
EOSINOPHIL NFR BLD: 0.3 % (ref 0–6.9)
ERYTHROCYTE [DISTWIDTH] IN BLOOD BY AUTOMATED COUNT: 44.7 FL (ref 35.9–50)
GFR SERPLBLD CREATININE-BSD FMLA CKD-EPI: 106 ML/MIN/1.73 M 2
GLUCOSE SERPL-MCNC: 107 MG/DL (ref 65–99)
HCT VFR BLD AUTO: 24.7 % (ref 37–47)
HGB BLD-MCNC: 8.1 G/DL (ref 12–16)
IMM GRANULOCYTES # BLD AUTO: 0.05 K/UL (ref 0–0.11)
IMM GRANULOCYTES NFR BLD AUTO: 0.4 % (ref 0–0.9)
LYMPHOCYTES # BLD AUTO: 2.39 K/UL (ref 1–4.8)
LYMPHOCYTES NFR BLD: 20.5 % (ref 22–41)
MCH RBC QN AUTO: 32.4 PG (ref 27–33)
MCHC RBC AUTO-ENTMCNC: 32.8 G/DL (ref 32.2–35.5)
MCV RBC AUTO: 98.8 FL (ref 81.4–97.8)
MONOCYTES # BLD AUTO: 0.86 K/UL (ref 0–0.85)
MONOCYTES NFR BLD AUTO: 7.4 % (ref 0–13.4)
NEUTROPHILS # BLD AUTO: 8.28 K/UL (ref 1.82–7.42)
NEUTROPHILS NFR BLD: 71.1 % (ref 44–72)
NRBC # BLD AUTO: 0 K/UL
NRBC BLD-RTO: 0 /100 WBC (ref 0–0.2)
PLATELET # BLD AUTO: 207 K/UL (ref 164–446)
PMV BLD AUTO: 10.3 FL (ref 9–12.9)
POTASSIUM SERPL-SCNC: 4.2 MMOL/L (ref 3.6–5.5)
RBC # BLD AUTO: 2.5 M/UL (ref 4.2–5.4)
SODIUM SERPL-SCNC: 132 MMOL/L (ref 135–145)
WBC # BLD AUTO: 11.7 K/UL (ref 4.8–10.8)

## 2024-04-11 PROCEDURE — G0378 HOSPITAL OBSERVATION PER HR: HCPCS

## 2024-04-11 PROCEDURE — 700102 HCHG RX REV CODE 250 W/ 637 OVERRIDE(OP): Performed by: HOSPITALIST

## 2024-04-11 PROCEDURE — A9270 NON-COVERED ITEM OR SERVICE: HCPCS | Performed by: HOSPITALIST

## 2024-04-11 PROCEDURE — 94760 N-INVAS EAR/PLS OXIMETRY 1: CPT

## 2024-04-11 PROCEDURE — 36415 COLL VENOUS BLD VENIPUNCTURE: CPT

## 2024-04-11 PROCEDURE — 99232 SBSQ HOSP IP/OBS MODERATE 35: CPT | Performed by: HOSPITALIST

## 2024-04-11 PROCEDURE — 80048 BASIC METABOLIC PNL TOTAL CA: CPT

## 2024-04-11 PROCEDURE — 85025 COMPLETE CBC W/AUTO DIFF WBC: CPT

## 2024-04-11 RX ORDER — SUMATRIPTAN 25 MG/1
25 TABLET, FILM COATED ORAL
Status: DISCONTINUED | OUTPATIENT
Start: 2024-04-11 | End: 2024-04-12 | Stop reason: HOSPADM

## 2024-04-11 RX ADMIN — OXYCODONE HYDROCHLORIDE 5 MG: 5 TABLET ORAL at 21:06

## 2024-04-11 RX ADMIN — LORAZEPAM 1 MG: 1 TABLET ORAL at 09:46

## 2024-04-11 RX ADMIN — OXYCODONE HYDROCHLORIDE 5 MG: 5 TABLET ORAL at 11:31

## 2024-04-11 RX ADMIN — OXYCODONE HYDROCHLORIDE 5 MG: 5 TABLET ORAL at 16:39

## 2024-04-11 RX ADMIN — OXYCODONE HYDROCHLORIDE 5 MG: 5 TABLET ORAL at 06:51

## 2024-04-11 RX ADMIN — OXYCODONE HYDROCHLORIDE 5 MG: 5 TABLET ORAL at 03:49

## 2024-04-11 RX ADMIN — LORAZEPAM 1 MG: 1 TABLET ORAL at 18:48

## 2024-04-11 ASSESSMENT — ENCOUNTER SYMPTOMS
NAUSEA: 0
COUGH: 0
SPUTUM PRODUCTION: 0
VOMITING: 0
CHILLS: 0
DIZZINESS: 0
ORTHOPNEA: 0
PALPITATIONS: 0
HEMOPTYSIS: 0

## 2024-04-11 ASSESSMENT — PAIN DESCRIPTION - PAIN TYPE
TYPE: ACUTE PAIN
TYPE: ACUTE PAIN

## 2024-04-11 NOTE — PROGRESS NOTES
"Monitor tech notified primary RN of patient sustaining HR between 130-140's. Patient in bed, /68, denies Chest pain or shortness of breath. Patient drinking Diet Dr. Pepper. Education provided about caffeine and sugar possibly contributing to heart rate. Patient stated \"I'm not going to stop drinking those, that's my one thing I won't do.\" MD notified. New order for CT Chest.   "

## 2024-04-11 NOTE — HOSPITAL COURSE
Asha Samuel has a past ministry that includes gastric bypass, Lynsey-Danlos disease, and prior cosmetic surgery.  Patient underwent cosmetic surgery on 4/10/2024.  Postoperatively the patient was tachycardic and required significant pain medications.  She was brought to the emergency room for evaluation.  The patient was hospitalized for ongoing tachycardia and pain control

## 2024-04-11 NOTE — PROGRESS NOTES
Pt utilized call light button and requested to be repositioned higher in bed. Prior to repositioning, RN noted her surgicial site underneath her arm was more exposed than prior. Notified MD Jones of finding, ABD Pads and tegiderm to be placed.     Patient tolerated dressing change and reinforcement.

## 2024-04-11 NOTE — CARE PLAN
"The patient is Stable - Low risk of patient condition declining or worsening    Shift Goals  Clinical Goals: Monitor JOHN output, monitor incision site and dressing site, pain management  Patient Goals: Pain management, go back home    Progress made toward(s) clinical / shift goals:    Problem: Pain - Standard  Goal: Alleviation of pain or a reduction in pain to the patient’s comfort goal  Outcome: Progressing  Note: Patient reported pain medication interventions to \"helps me sleep\". Patient encouraged to voice feelings of increased pain sensation. Extra pillows placed in desired location. Bedside table moved closer to patient for minimal exertion.      Problem: Fall Risk  Goal: Patient will remain free from falls  Outcome: Progressing  Note: Assessed for fall risk factors and implemented fall precaution. Bed alarm on, patient utilized call light button to request needs. Assistive devices provided to patient when ambulating.         Patient is not progressing towards the following goals:      "

## 2024-04-11 NOTE — DISCHARGE PLANNING
Anticipated Discharge Disposition: Home Hotel    Action: Voalte from Dr Angela if ER LUCHO contacting [facility] for pt. Reinforced pt needs to contact her hotel directly herself, ER CM cannot check her out of a hotel, that cab number for belonging if able to coordinate that was provided. That she was convinced  Dr Davida LEIJA office was assisting her. 17:30 Addendum ER CM did attempt making several calls [facility] and there is no contact available other than the usual [employer] and Security to assist her. No  or Liaison of sorts. She will need to follow thru as usual. ER CM recommends that she contact airlines and advise them to have Wheelchair at front of airport for her  and boarding assist as I hopeful she preplanned this. ER LUCHO concerns for lifting precautions reinforced for her post op teaching.18;26 RN has done some teaching. She has updated pt and pt will reach out to [facility] later. She did not preplan flight assist. ER LUCHO does still recommend this if able, may be able to still add for a fee, also recommend transport home for baggage assist ( wt lifting restriction gen 10 lbs post op total body lift x6-8 wks. Encourage lining up home help in her area for approx next 2 weeks while recovering.    Barriers to Discharge: pain control HR rate    Plan: No further ER LUCHO needs.

## 2024-04-11 NOTE — FACE TO FACE
Face to Face Note  -  Durable Medical Equipment    Willie Townsend M.D. - NPI: [id number]  I certify that this patient is under my care and that they had a durable medical equipment(DME)face to face encounter by myself that meets the physician DME face-to-face encounter requirements with this patient on:    Date of encounter:   Patient:                    MRN:                       YOB: 2026  Asha Davalos  [id number]  1970     The encounter with the patient was in whole, or in part, for the following medical condition, which is the primary reason for durable medical equipment:  Other - post operative pain    I certify that, based on my findings, the following durable medical equipment is medically necessary:    Walkers.    My Clinical findings support the need for the above equipment due to:  Abnormal Gait

## 2024-04-11 NOTE — PROGRESS NOTES
Telemetry Shift Summary    Rhythm ST  HR Range 100s  Ectopy -  Measurements 0.16/0.08/0.32        Normal Values  Rhythm SR  HR Range    Measurements 0.12-0.20 / 0.06-0.10  / 0.30-0.52

## 2024-04-11 NOTE — PROGRESS NOTES
Patient reported she did not want her morning labs and wanted to get drawn at 0600. Lab voalted and notified of patient's request.

## 2024-04-11 NOTE — PROGRESS NOTES
4 Eyes Skin Assessment Completed by JOSHUA Peña and JOSHUA Reeves.    Head WDL  Ears WDL  Nose WDL  Mouth WDL  Neck WDL  Breast/Chest Bruising, Edema, and Incision  Shoulder Blades WDL  Spine WDL  (R) Arm/Elbow/Hand Redness, Bruising, and Swelling, surgical incision  (L) Arm/Elbow/Hand Redness, Bruising, Swelling, Discoloration, and Edema, open surgical incision  Abdomen WDL  Groin WDL  Scrotum/Coccyx/Buttocks surgical incision with bilateral JPs  (R) Leg WDL  (L) Leg WDL  (R) Heel/Foot/Toe WDL  (L) Heel/Foot/Toe WDL          Devices In Places Tele Box and Blood Pressure Cuff      Interventions In Place N/A    Possible Skin Injury No    Pictures Uploaded Into Epic Yes  Wound Consult Placed N/A  RN Wound Prevention Protocol Ordered No

## 2024-04-11 NOTE — PROGRESS NOTES
Report received from Reed and pt arrived via [facility].  Pt ambulated from the [facility] to her bed. Assessed wounds and the left elbow incision is open and there is changes to cms to the left hand; called Dr. Travis with these changes and she was came to bedside.  She opened the incision about the elbow  and placed adapatic and abd pad and then removed the dressing from the left arm.  Elevated her arms are 2 pillows.  She also wanted the IVF stopped; contacted Dr. Townsend for this order.  Pt has bilateral isa on her hip/flank coming from the dressings and they have not been emptied since surgery.  She also has a support bra with abd pads.  She also has tegaderm with betadine all across her lower back into her buttocks; she also has a pillow to elevate her butt.     When doing her admit profile, pt states that she has a flight at 1100 tomorrow, but has to be take transport back to her hotel to check out and then to the airport.    Echo completed this evening and assisted her to the bathroom; she needed assistance in wiping and lowering to the toilet.   She states that her dinner was too difficult to eat and called for her request.  Also medicated for pain with oxycodone.    Bedside report given to Leandro and showed him her wounds to complete the 4 eyes.  During this she reports that the airline cancelled her flight tomorrow and she doesn't know what she is going to do as she has no money.

## 2024-04-11 NOTE — PROGRESS NOTES
Hospital Medicine Daily Progress Note    Date of Service  4/11/2024    Chief Complaint  Asha Davalos is a 53 y.o. 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  Patient underwent cosmetic surgery on 4/10/2024.  Postoperatively the patient was tachycardic and required significant pain medications.  She was brought to the emergency room for evaluation.  The patient was hospitalized for ongoing tachycardia and pain control    Interval Problem Update  Patient appears more comfortable today  Swelling in her left hand, it is painful, she is trying to keep her arms elevated above her heart  Remains tachycardic with heart rates as high as 150s, she does not have any chest pain or shortness of breath    I have discussed this patient's plan of care and discharge plan at IDT rounds today with Case Management, Nursing, Nursing leadership, and other members of the IDT team.    Consultants/Specialty  plastic surgery    Code Status  Full Code    Disposition  The patient is not medically cleared for discharge to home or a post-acute facility.  Anticipate discharge to: home with close outpatient follow-up    I have placed the appropriate orders for post-discharge needs.    Review of Systems  Review of Systems   Constitutional:  Negative for chills and malaise/fatigue.   Respiratory:  Negative for cough, hemoptysis and sputum production.    Cardiovascular:  Negative for chest pain, palpitations and orthopnea.   Gastrointestinal:  Negative for nausea and vomiting.   Skin:  Negative for itching and rash.   Neurological:  Negative for dizziness.   All other systems reviewed and are negative.       Physical Exam  Temp:  [36.7 °C (98 °F)-37.5 °C (99.5 °F)] 36.7 °C (98 °F)  Pulse:  [] 139  Resp:  [18] 18  BP: (112-132)/(63-91) 128/68  SpO2:  [94 %-98 %] 94 %    Physical  Exam  Constitutional:       General: She is not in acute distress.     Appearance: Normal appearance. She is normal weight.   HENT:      Head: Normocephalic and atraumatic.      Mouth/Throat:      Mouth: Mucous membranes are moist.   Eyes:      Extraocular Movements: Extraocular movements intact.      Conjunctiva/sclera: Conjunctivae normal.      Pupils: Pupils are equal, round, and reactive to light.   Cardiovascular:      Rate and Rhythm: Regular rhythm. Tachycardia present.      Pulses: Normal pulses.   Pulmonary:      Effort: Pulmonary effort is normal.      Breath sounds: Normal breath sounds.   Abdominal:      General: Abdomen is flat. Bowel sounds are normal.      Palpations: Abdomen is soft.      Tenderness: There is no abdominal tenderness.   Musculoskeletal:         General: Normal range of motion.      Cervical back: Normal range of motion and neck supple.      Comments: Left arm is swollen from wrist to elbow  Open incision   Skin:     General: Skin is warm and dry.      Capillary Refill: Capillary refill takes less than 2 seconds.      Coloration: Skin is not jaundiced.   Neurological:      General: No focal deficit present.      Mental Status: She is alert and oriented to person, place, and time.      Cranial Nerves: No cranial nerve deficit.      Gait: Gait normal.         Fluids    Intake/Output Summary (Last 24 hours) at 4/11/2024 1626  Last data filed at 4/11/2024 1528  Gross per 24 hour   Intake 480 ml   Output 160 ml   Net 320 ml       Laboratory  Recent Labs     04/10/24  1052 04/11/24  0827   WBC 14.1* 11.7*   RBC 3.02* 2.50*   HEMOGLOBIN 9.9* 8.1*   HEMATOCRIT 30.6* 24.7*   .3* 98.8*   MCH 32.8 32.4   MCHC 32.4 32.8   RDW 45.5 44.7   PLATELETCT 259 207   MPV 9.9 10.3     Recent Labs     04/10/24  1052 04/11/24  0827   SODIUM 133* 132*   POTASSIUM 4.9 4.2   CHLORIDE 102 102   CO2 16* 21   GLUCOSE 133* 107*   BUN 19 13   CREATININE 0.90 0.60   CALCIUM 8.4 8.5                    Imaging  EC-ECHOCARDIOGRAM COMPLETE W/O CONT   Final Result      CT-CTA CHEST PULMONARY ARTERY W/ RECONS    (Results Pending)        Assessment/Plan  * Tachycardia- (present on admission)  Assessment & Plan  Patient has a persistent tachycardia at rest despite fluid resuscitation in the emergency room    4/11:  Patient continues to have tachycardia today  Echocardiogram unremarkable  She does have recent history of travel  I have ordered a CTA to rule out pulmonary embolism    Left arm swelling- (present on admission)  Assessment & Plan  4/11:   I left him had to be decompressed yesterday  I discussed with Dr. Angela through the day  She has made recommendations for arm wrap  She does have some concerns for developing compartment syndrome  Continue hospital observation, will evaluate her arm tomorrow    Post-operative pain- (present on admission)  Assessment & Plan  Pain management order set initiated    History of gastric bypass- (present on admission)  Assessment & Plan  Patient request modified diet without sugar    Lynsey-Danlos disease- (present on admission)  Assessment & Plan  History of Lynsey-Danlos disease  Patient states that she had preoperative evaluation by cardiology         VTE prophylaxis:    enoxaparin ppx      I have performed a physical exam and reviewed and updated ROS and Plan today (4/11/2024). In review of yesterday's note (4/10/2024), there are no changes except as documented above.

## 2024-04-11 NOTE — PROGRESS NOTES
Called and spoke to Dr. Angela for ace wrap instructions.     Items Needed:   2 or 3 inch ace wrap.    Start at the palm of hand, not the fingers.   Go around the hand, up to wrist, then up to forearm.   Not too tight, but enough compression to reduce swelling in hand.     Avoid wrapping around the incision.

## 2024-04-11 NOTE — CARE PLAN
Problem: Pain - Standard  Goal: Alleviation of pain or a reduction in pain to the patient’s comfort goal  Outcome: Progressing  Note: Using PRNs and anti-anxiety medications     Problem: Knowledge Deficit - Standard  Goal: Patient and family/care givers will demonstrate understanding of plan of care, disease process/condition, diagnostic tests and medications  Outcome: Progressing  Note: Admit profile completed   The patient is Watcher - Medium risk of patient condition declining or worsening    Shift Goals  Clinical Goals: Admit profile, monitor JOHN output, surgeon f/u  Patient Goals: Feel better and get on a plane in the morning    Progress made toward(s) clinical / shift goals:  achieved    Patient is not progressing towards the following goals:

## 2024-04-11 NOTE — ASSESSMENT & PLAN NOTE
4/11:   I left him had to be decompressed yesterday  I discussed with Dr. Angela through the day  She has made recommendations for arm wrap  She does have some concerns for developing compartment syndrome  Continue hospital observation, will evaluate her arm tomorrow

## 2024-04-11 NOTE — PROGRESS NOTES
"Received report from Leandro LEWIS. Assessment completed. Patient A&O x4, reporting anxiety, medication given per MAR orders. Patient is on room air, Spo2 >90%.  Patient reporting 7/10 pain, repositioned for intervention, patient reporting increase in comfort stating \"that feels much better\". Bed is locked and in lowest position. Fall precautions in place .Call light within reach. No other needs at this time.       0950 Home Medications brought to pharmacy. Patient aware of medications stored with pharmacy during hospital stay.           "

## 2024-04-11 NOTE — PROGRESS NOTES
Telemetry Shift Summary     Rhythm SR - ST  HR Range , Touched up to 156 when ambulating  Ectopy N/A  Measurements  0.12/0.08/0.32     Normal Values  Rhythm SR  HR Range    Measurements 0.12-0.20 / 0.06-0.10  / 0.30-0.52

## 2024-04-11 NOTE — PROGRESS NOTES
"Received report from Casey LEWIS, at pt bedside. Patient skin and incision site assessed with Casey. Patient reported discomfort and pain when moving. Patient also reported her flight was \"cancelled and I won't get a refund for another 1-2 days\". POC discussed. Call light and belongings within reach. Arms elevated and supported with extra pillows, patient utilized own pillow that is underneath her, per patient pillow is used \"to stop the incisions from undoing\". Bed locked and in low position. Alarm on and fall precautions in place.    "

## 2024-04-12 ENCOUNTER — APPOINTMENT (OUTPATIENT)
Dept: RADIOLOGY | Facility: MEDICAL CENTER | Age: 54
End: 2024-04-12
Attending: HOSPITALIST
Payer: MEDICARE

## 2024-04-12 VITALS
SYSTOLIC BLOOD PRESSURE: 128 MMHG | WEIGHT: 125.22 LBS | RESPIRATION RATE: 17 BRPM | DIASTOLIC BLOOD PRESSURE: 75 MMHG | HEIGHT: 65 IN | BODY MASS INDEX: 20.86 KG/M2 | HEART RATE: 98 BPM | TEMPERATURE: 98.1 F | OXYGEN SATURATION: 98 %

## 2024-04-12 PROCEDURE — 93970 EXTREMITY STUDY: CPT

## 2024-04-12 PROCEDURE — 93970 EXTREMITY STUDY: CPT | Mod: 26 | Performed by: INTERNAL MEDICINE

## 2024-04-12 PROCEDURE — 700102 HCHG RX REV CODE 250 W/ 637 OVERRIDE(OP): Performed by: HOSPITALIST

## 2024-04-12 PROCEDURE — G0378 HOSPITAL OBSERVATION PER HR: HCPCS

## 2024-04-12 PROCEDURE — 94760 N-INVAS EAR/PLS OXIMETRY 1: CPT

## 2024-04-12 PROCEDURE — 99239 HOSP IP/OBS DSCHRG MGMT >30: CPT | Performed by: HOSPITALIST

## 2024-04-12 PROCEDURE — A9270 NON-COVERED ITEM OR SERVICE: HCPCS | Performed by: HOSPITALIST

## 2024-04-12 RX ADMIN — OXYCODONE HYDROCHLORIDE 5 MG: 5 TABLET ORAL at 05:58

## 2024-04-12 RX ADMIN — OXYCODONE HYDROCHLORIDE 5 MG: 5 TABLET ORAL at 00:09

## 2024-04-12 RX ADMIN — LORAZEPAM 1 MG: 1 TABLET ORAL at 14:22

## 2024-04-12 RX ADMIN — LORAZEPAM 1 MG: 1 TABLET ORAL at 02:04

## 2024-04-12 RX ADMIN — OXYCODONE HYDROCHLORIDE 5 MG: 5 TABLET ORAL at 09:40

## 2024-04-12 RX ADMIN — ACETAMINOPHEN 650 MG: 325 TABLET ORAL at 14:49

## 2024-04-12 NOTE — PROGRESS NOTES
Telemetry Shift Summary    Rhythm ST  HR Range 114-119  Ectopy : rPAC  Measurements 0.14/0.06/0.30    Normal Values  Rhythm SR  HR Range:   Measurements: 0.12-0.20/0.06-0.10/0.30-0.52

## 2024-04-12 NOTE — CARE PLAN
The patient is Stable - Low risk of patient condition declining or worsening    Shift Goals  Clinical Goals: Monitor JOHN output, monitor incision sites, pain managment  Patient Goals: Pain managment    Progress made toward(s) clinical / shift goals:    Problem: Knowledge Deficit - Standard  Goal: Patient and family/care givers will demonstrate understanding of plan of care, disease process/condition, diagnostic tests and medications  Outcome: Progressing  Note: Reviewed POC: Discussed CT, pain management, JOHN draining output monitoring, Plastic surgeon involvement, wound maintenance and maintaining vitals per MD ordered.      Problem: Pain - Standard  Goal: Alleviation of pain or a reduction in pain to the patient’s comfort goal  Outcome: Progressing  Note: Discussed pharmacological and non-pharmacological pain management interventions.        Patient is not progressing towards the following goals:

## 2024-04-12 NOTE — PROGRESS NOTES
"1015: MD Notified that patient would like Imitrex. She has 100 mg in med-rec. Patient states \"I break them into 3.\"    1604: MD notified that patient is utilizing walker to ambulate. Patient states this would be really helpful  to have for discharge.     1630: Patient went to restroom. She had a small amount of bright red blood on toilet paper. MD notified. Repeat CBC ordered.       1842: MD notified that CT was unable to be completed due to no IV access. Per CT Staff Marquis Resendez, IV was bubbling upon their injection , but IV was still working.         "

## 2024-04-12 NOTE — PROGRESS NOTES
Telemetry Shift Summary     Rhythm: ST  Rate: 109-140  Measurements:.14/.08/.34   Ectopy (reported by Monitor Tech): rpVC     Normal Values  Rhythm: Sinus  HR:   Measurements: 0.12-0.20/0.06-0.10/0.30-0.52

## 2024-04-12 NOTE — CARE PLAN
Problem: Pain - Standard  Goal: Alleviation of pain or a reduction in pain to the patient’s comfort goal  Outcome: Progressing     Problem: Knowledge Deficit - Standard  Goal: Patient and family/care givers will demonstrate understanding of plan of care, disease process/condition, diagnostic tests and medications  Outcome: Progressing     Problem: Fall Risk  Goal: Patient will remain free from falls  Outcome: Progressing   The patient is Stable - Low risk of patient condition declining or worsening    Shift Goals  Clinical Goals: Monitor JOHN output, monitor incision sites, pain managment  Patient Goals: Pain managment    Progress made toward(s) clinical / shift goals:  safe ambulating    Patient is not progressing towards the following goals:

## 2024-04-12 NOTE — DISCHARGE PLANNING
Case Management Discharge Planning    Admission Date: 4/9/2024  GMLOS:    ALOS: 0    6-Clicks ADL Score: 13  6-Clicks Mobility Score: 17    Anticipated Discharge Dispo: Discharge Disposition: Discharged to home/self care (01)    DME Needed: No    Action(s) Taken: Transport Arranged     Escalations Completed: None    Medically Clear: Yes    Informed by charge RN patient is catching a flight today to go home. Informed patient is appropriate for taxi voucher. [facility].

## 2024-04-12 NOTE — DISCHARGE SUMMARY
Discharge Summary    CHIEF COMPLAINT ON ADMISSION  Chief Complaint   Patient presents with    Post-Op Complications     Pt presents via EMS d/t post operative pain after having Thorachoplasty, bilateral breast mastopexy, buttock lift, left thigh lift, fat transfer to buttocks, lipostuction and fat harvesting done today.        Reason for Admission  EMS     Admission Date  4/9/2024    CODE STATUS  Full Code    HPI & HOSPITAL COURSE  This is a 53 y.o. female here with postoperative pain and tachycardia    Asha Samuel has a past medical history that includes gastric bypass, Lynsey-Danlos disease, and prior cosmetic surgery.  Patient underwent cosmetic surgery on 4/10/2024.  Postoperatively the patient was tachycardic and required significant pain medications.  She was brought to the emergency room for evaluation.  The patient was hospitalized for ongoing tachycardia and pain control.    The patient developed some swelling at her left hand and her plastic surgeon was involved in the case.  This area was released and dressings revised.  Swelling in her left hand was observed closely for compression syndrome.  Eye symptoms have resolved and plastic surgery has cleared the patient for discharged home    Workup for tachycardia has been unremarkable.  The patient underwent an echocardiogram which showed hyperdynamic left ventricle but is otherwise normal.  She also had bilateral lower extremity Doppler ultrasounds which shows no evidence of DVT.  Overall the patient is having no chest pain, she is on room air, she does not have any shortness of breath.  Overall her heart rate is improved since admission with fluids and control of her pain.     Therefore, she is discharged in good and stable condition to home with close outpatient follow-up.    Discharge Date  4/12/2024    FOLLOW UP ITEMS POST DISCHARGE  Follow-up with primary care provider  Follow-up with plastic surgery    DISCHARGE DIAGNOSES  Principal Problem:     "Tachycardia (POA: Yes)  Active Problems:    Left arm swelling (POA: Yes)    Post-operative pain (POA: Yes)    Lynsey-Danlos disease (POA: Yes)      Overview: Gets Echo every year       Last one in June-negative      Primary problem is pain      Multiple hip dislocations      Wrist fx      Wears ankle and wrist braces- no fx by xray      Last Echo 6/2014 EF 60-65%, no aneurysm      IMO load March 2020    History of gastric bypass (POA: Yes)  Resolved Problems:    * No resolved hospital problems. *      FOLLOW UP  No future appointments.  George Tabares M.D.  [id number] [place] [id number] [phone]      As needed, If symptoms worsen      MEDICATIONS ON DISCHARGE     Medication List        CONTINUE taking these medications        Instructions   carisoprodol 350 MG Tabs  Commonly known as: Soma   Take 350 mg by mouth every 8 hours as needed for Muscle Spasms. Per [facility], pt last filled on 2/16/2024 #15 tablets  Dose: 350 mg     HYDROcodone-acetaminophen 5-325 MG Tabs per tablet  Commonly known as: Norco   Take 1 Tablet by mouth every 6 hours as needed. Per [facility], pt last filled on 2/8/2024 #60 tablets  Indications: Pain  Dose: 1 Tablet     methocarbamol 750 MG Tabs  Commonly known as: Robaxin   Take 750 mg by mouth 3 times a day. Per [facility], pt last filled on 3/26/2024  Dose: 750 mg     sumatriptan 100 MG tablet  Commonly known as: Imitrex   Take 100 mg by mouth one time as needed for Migraine.  Dose: 100 mg              Allergies  Allergies   Allergen Reactions    Ceftin Itching     \"my insides are itchy.\"       DIET  Orders Placed This Encounter   Procedures    Diet Order Diet: Consistent CHO (Diabetic) (No simple sugars such as juice); Miscellaneous modifications: (optional): Vegetarian     Standing Status:   Standing     Number of Occurrences:   1     Order Specific Question:   Diet:     Answer:   Consistent CHO (Diabetic) [4]     Comments:   No " simple sugars such as juice     Order Specific Question:   Miscellaneous modifications: (optional)     Answer:   Vegetarian [13]       ACTIVITY  As tolerated.  Weight bearing as tolerated patient is to lift a maximum of 10 pounds, walker has been provided    CONSULTATIONS  Plastic Surgery    PROCEDURES    Bilateral lower extremity ultrasound 4/12/2024:   PROCEDURES:   Bilateral lower extremity venous duplex imaging.    Serial compression, color, and spectral Doppler flow evaluations were    performed.    The following venous structures were evaluated: common femoral, deep    femoral, proximal great saphenous, femoral, popliteal, peroneal, and    posterior tibial veins.       FINDINGS:   BILATERAL LOWER EXTREMITIES   All veins demonstrate complete color filling and compressibility with    normal venous flow dynamics including spontaneous flow and respiratory    phasicity.    No deep venous thrombosis. extremity ultrasound    Echocardiogram 4/10/2024:  CONCLUSIONS  Technically difficult study   Hyperdynamic left ventricular systolic function.  The left ventricular ejection fraction is visually estimated to be 70-  75%.  Mild concentric left ventricular hypertrophy.  Normal right ventricular size and systolic function.  No evidence of valvular abnormality based on Doppler evaluation.     LABORATORY  Lab Results   Component Value Date    SODIUM 132 (L) 04/11/2024    POTASSIUM 4.2 04/11/2024    CHLORIDE 102 04/11/2024    CO2 21 04/11/2024    GLUCOSE 107 (H) 04/11/2024    BUN 13 04/11/2024    CREATININE 0.60 04/11/2024        Lab Results   Component Value Date    WBC 11.7 (H) 04/11/2024    HEMOGLOBIN 8.1 (L) 04/11/2024    HEMATOCRIT 24.7 (L) 04/11/2024    PLATELETCT 207 04/11/2024        Total time of the discharge process exceeds 38 minutes.

## 2024-04-12 NOTE — PROGRESS NOTES
Hand and forearm swelling significantly improved with elevation and compression.  She was given instructions on care of the areas.  Arms dressings were placed today with a light compressive wrap.    She will leave dressings in place for 2 days then change daily.  Sponge baths for now.  Remove drains and change back pads  D/c today   She has follow up with me on the 24th